# Patient Record
Sex: MALE | Race: WHITE | Employment: UNEMPLOYED | URBAN - METROPOLITAN AREA
[De-identification: names, ages, dates, MRNs, and addresses within clinical notes are randomized per-mention and may not be internally consistent; named-entity substitution may affect disease eponyms.]

---

## 2021-02-22 ENCOUNTER — HOSPITAL ENCOUNTER (EMERGENCY)
Age: 36
Discharge: HOME | End: 2021-02-23
Attending: EMERGENCY MEDICINE
Payer: MEDICAID

## 2021-02-22 ENCOUNTER — APPOINTMENT (OUTPATIENT)
Dept: GENERAL RADIOLOGY | Age: 36
End: 2021-02-22
Attending: EMERGENCY MEDICINE
Payer: MEDICAID

## 2021-02-22 VITALS
SYSTOLIC BLOOD PRESSURE: 154 MMHG | TEMPERATURE: 97.9 F | HEART RATE: 132 BPM | WEIGHT: 229.5 LBS | BODY MASS INDEX: 30.42 KG/M2 | OXYGEN SATURATION: 99 % | HEIGHT: 73 IN | DIASTOLIC BLOOD PRESSURE: 71 MMHG | RESPIRATION RATE: 17 BRPM

## 2021-02-22 DIAGNOSIS — S61.210A LACERATION OF RIGHT INDEX FINGER WITHOUT FOREIGN BODY WITHOUT DAMAGE TO NAIL, INITIAL ENCOUNTER: ICD-10-CM

## 2021-02-22 DIAGNOSIS — S68.612A COMPLETE TRAUMATIC AMPUTATION OF RIGHT MIDDLE FINGER THROUGH PHALANX, INITIAL ENCOUNTER: Primary | ICD-10-CM

## 2021-02-22 PROCEDURE — 74011250636 HC RX REV CODE- 250/636: Performed by: EMERGENCY MEDICINE

## 2021-02-22 PROCEDURE — 73130 X-RAY EXAM OF HAND: CPT

## 2021-02-22 PROCEDURE — 74011250637 HC RX REV CODE- 250/637: Performed by: EMERGENCY MEDICINE

## 2021-02-22 PROCEDURE — 90471 IMMUNIZATION ADMIN: CPT

## 2021-02-22 PROCEDURE — 75810000293 HC SIMP/SUPERF WND  RPR

## 2021-02-22 PROCEDURE — 99284 EMERGENCY DEPT VISIT MOD MDM: CPT

## 2021-02-22 PROCEDURE — 90715 TDAP VACCINE 7 YRS/> IM: CPT | Performed by: EMERGENCY MEDICINE

## 2021-02-22 RX ORDER — ACETAMINOPHEN 500 MG
1000 TABLET ORAL ONCE
Status: COMPLETED | OUTPATIENT
Start: 2021-02-22 | End: 2021-02-22

## 2021-02-22 RX ORDER — OXYCODONE HYDROCHLORIDE 5 MG/1
5 TABLET ORAL
Status: COMPLETED | OUTPATIENT
Start: 2021-02-22 | End: 2021-02-22

## 2021-02-22 RX ORDER — IBUPROFEN 600 MG/1
600 TABLET ORAL ONCE
Status: COMPLETED | OUTPATIENT
Start: 2021-02-22 | End: 2021-02-22

## 2021-02-22 RX ADMIN — TETANUS TOXOID, REDUCED DIPHTHERIA TOXOID AND ACELLULAR PERTUSSIS VACCINE, ADSORBED 0.5 ML: 5; 2.5; 8; 8; 2.5 SUSPENSION INTRAMUSCULAR at 22:11

## 2021-02-22 RX ADMIN — IBUPROFEN 600 MG: 600 TABLET, FILM COATED ORAL at 22:11

## 2021-02-22 RX ADMIN — ACETAMINOPHEN 1000 MG: 500 TABLET ORAL at 22:11

## 2021-02-22 RX ADMIN — OXYCODONE 5 MG: 5 TABLET ORAL at 22:11

## 2021-02-22 NOTE — Clinical Note
[address] 
[facility] EMERGENCY DEPT 
[address] [facility] [place] [id number] [phone] Work/School Note Date: 2/22/2021 To Whom It May concern: Galina Barrett was seen and treated today in the emergency room by the following provider(s): 
Attending Provider: Harris Becerra MD. Galina Barrett is excused from work/school on 02/22/21 and 02/23/21. He is medically clear to return to work/school on 2/24/2021. Sincerely, Laurence Gilford, MD

## 2021-02-23 PROCEDURE — 75810000293 HC SIMP/SUPERF WND  RPR

## 2021-02-23 NOTE — DISCHARGE INSTRUCTIONS
In 2 days I want you to remove and change the bandages. You can decide if you would like to bandage the index finger or leave it open to air, with the middle finger I want you to continue to change the bandage for at least 1 week. The sutures for the index finger need to come out in 7 to 10 days. Please observe for signs of infection.

## 2021-02-23 NOTE — ED NOTES
Mino Robertson MD reviewed discharge instructions with the patient. The patient verbalized understanding. All questions and concerns were addressed. The patient declined a wheelchair and is discharged ambulatory in the care of family members with instructions and prescriptions in hand. Pt is alert and oriented x 4. Respirations are clear and unlabored.

## 2021-02-23 NOTE — ED PROVIDER NOTES
EMERGENCY DEPARTMENT HISTORY AND PHYSICAL EXAM      Date: 2/22/2021  Patient Name: Lita Hankins    History of Presenting Illness     Chief Complaint   Patient presents with    Laceration     Pt was putting a new blade on his saw tonight when he accidentally cut two of his fingers on his right hand. Middle and ring finger.  Finger Pain       History Provided By: Patient    HPI: Lita Hankins, 30 y.o. male  With past medical history of opiate use disorder currently clean for about 2 years presenting today with a injury due to a saw. 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 He notes pain to these areas. No tingling, no other complaints here. He wrapped them and came here. Unsure of his last tetanus shot. There are no other complaints, changes, or physical findings at this time. PCP: Unknown, Provider    No current facility-administered medications on file prior to encounter. No current outpatient medications on file prior to encounter. Past History     Past Medical History:  Past Medical History:   Diagnosis Date    Abscess     Hepatitis C        Past Surgical History:  Past Surgical History:   Procedure Laterality Date    HX WISDOM TEETH EXTRACTION         Family History:  No family history on file.     Social History:  Social History     Tobacco Use    Smoking status: Current Every Day Smoker     Packs/day: 0.50   Substance Use Topics    Alcohol use: No    Drug use: No       Allergies:  No Known Allergies      Review of Systems   Constitutional: No  fever  Skin: No  rash  HEENT: No  nasal congestion  Resp: No cough  CV: No chest pain  GI: no vomiting  : No dysuria  MSK: + joint pain  Neuro: No numbness  Psych: No anxiety      Physical Exam         Diagnostic Study Results     Labs -   No results found for this or any previous visit (from the past 12 hour(s)). Radiologic Studies -   XR HAND RT MIN 3 V   Final Result      Traumatic amputation of the distal tuft of the third distal phalanx. XR 2ND FINGER RT MIN 2 V    (Results Pending)     CT Results  (Last 48 hours)    None        CXR Results  (Last 48 hours)    None          Medical Decision Making   I am the first provider for this patient. I reviewed the vital signs, available nursing notes, past medical history, past surgical history, family history and social history. Vital Signs-Reviewed the patient's vital signs. Patient Vitals for the past 12 hrs:   Temp Pulse Resp BP SpO2   02/22/21 2208     99 %   02/22/21 2207    (!) 154/71    02/22/21 2139 97.9 °F (36.6 °C) (!) 132 17 126/81 98 %       Pulse Oximetry Analysis - 98% on room air  -  Interpretation: Normal    Cardiac Monitor:   Rate: 132 bpm  Rhythm: Sinus Tachycardia      Provider Notes (Medical Decision Making):     Differential Diagnosis: Fracture, avulsion injury,, contusion    Initial Plan: Patient has avulsion injury on the middle finger of the right hand missing most of the fingernail, but still with nailbed intact, he does have fracture of the distal phalanx associated with this. I wrapped this and provided wound care and discuss plan to follow-up with hand surgery. The patient had a laceration on the index finger and this was repaired as per the procedure note. Ultimately patient is discharged after this after tetanus update. ED Course:   Initial assessment performed. The patients presenting problems have been discussed, and they are in agreement with the care plan formulated and outlined with them. I have encouraged them to ask questions as they arise throughout their visit. ED Course as of Feb 23 0050   Mon Feb 22, 2021   2315 On my interpretation of the patient's x-ray there is Traumatic amputation in the distal phalanx of the third digit on the right hand.     [NW]      ED Course User Index  [NW] Adry Choi MD Ashley Sanabria MD, am the attending of record for this patient encounter. Procedure Note - Laceration Repair:  12:46 AM  Procedure by Jennifer Membreno MD  .  Complexity: simple  2cm stellate laceration to right index finger  was irrigated copiously with NS under jet lavage, prepped with Chlorprep and draped in a sterile fashion. The area was anesthetized with 4 mLs of  Lidocaine 1% with epinephrine via digital block. The wound was explored with the following results: No foreign bodies found, No tendon laceration seen. The wound was repaired with One layer suture closure: Skin Layer:  12 sutures placed, stitch type:simple interrupted, suture: 6-0 chromic gut. .  The wound was closed with good hemostasis and approximation. Sterile dressing applied. Estimated blood loss: 5cc  The procedure took 1-15 minutes, and pt tolerated well. Dispo: Discharged. The patient has been re-evaluated and is ready for discharge. Reviewed available results with patient. Counseled patient on diagnosis and care plan. Patient has expressed understanding, and all questions have been answered. Patient agrees with plan and agrees to follow up as recommended, or to return to the ED if their symptoms worsen. Discharge instructions have been provided and explained to the patient, along with reasons to return to the ED. PLAN:  There are no discharge medications for this patient. 2.     Follow-up Information     Follow up With Specialties Details Why Contact Info    [facility]  Schedule an appointment as soon as possible for a visit   [facility]  [address]  [address] [facility]  [phone]        3. Return to ED if worse       Diagnosis     Clinical Impression:   1. 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  Laceration of right index finger without foreign body without damage to nail, initial encounter        Attestations:    Sandra Shin Almaz Boswell MD    Please note that this dictation was completed with [employer], the computer voice recognition software. Quite often unanticipated grammatical, syntax, homophones, and other interpretive errors are inadvertently transcribed by the computer software. Please disregard these errors. Please excuse any errors that have escaped final proofreading. Thank you.

## 2021-02-23 NOTE — ED NOTES
Pt states that he was using a saw at home to change a blade and he went too far and it cut his rt middle finger and 2nd digit (pointer finger). Pt denies taking any medicines at home PTA. Pt states pain in 10/10. Pt denies any cp, sob, n/v/d, chills at this time. Pt a/o x4. Upon assessment, bleeding is controlled and fingers were wrapped in triage by tech. Pt resting in bed with call bell within reach. 2230: Wood mD at bedside at this time. Middle finger missing fingernail and anterior tip, will need wound care. Pointer finger has skin flap and being stitched by wood MD at this time.

## 2021-02-26 ENCOUNTER — HOSPITAL ENCOUNTER (EMERGENCY)
Age: 36
Discharge: HOME | End: 2021-02-26
Attending: EMERGENCY MEDICINE
Payer: MEDICAID

## 2021-02-26 VITALS
RESPIRATION RATE: 18 BRPM | TEMPERATURE: 98.4 F | HEART RATE: 100 BPM | DIASTOLIC BLOOD PRESSURE: 98 MMHG | SYSTOLIC BLOOD PRESSURE: 172 MMHG | BODY MASS INDEX: 30.68 KG/M2 | HEIGHT: 73 IN | OXYGEN SATURATION: 98 % | WEIGHT: 231.48 LBS

## 2021-02-26 DIAGNOSIS — S68.119D AMPUTATION FINGER, SUBSEQUENT ENCOUNTER: Primary | ICD-10-CM

## 2021-02-26 DIAGNOSIS — S61.219D FINGER LACERATION, SUBSEQUENT ENCOUNTER: ICD-10-CM

## 2021-02-26 DIAGNOSIS — R03.0 ELEVATED BLOOD PRESSURE READING: ICD-10-CM

## 2021-02-26 PROCEDURE — 99281 EMR DPT VST MAYX REQ PHY/QHP: CPT

## 2021-02-26 NOTE — ED PROVIDER NOTES
EMERGENCY DEPARTMENT HISTORY AND PHYSICAL EXAM      Date: 2/26/2021  Patient Name: Alisha Kelly    History of Presenting Illness     Chief Complaint   Patient presents with    Laceration     Monday he cut his first 2 fingers on his right hand on a table saw. He was seen here and supposed to f/u with ortho. He went over there and they wouldn't see him because he didn't have his id and sent him over here to check his fingers. History Provided By: Patient    HPI: Alisha Kelly, 30 y.o. RHD male with presents to the ED for wound check. He was seen in this ED 2 days ago for a right distal finger amputation and finger laceration following injury with table saw. He attempted to follow-up with Ortho/hand but was turned away because he did not have an ID. Patient states he is unable to get an ID at this time since he does not have a Social Security card and he is currently under house arrest.  He has been cleaning the wound and performing at home dressing changes without difficulty. He denies increasing pain, advancing redness, purulent drainage, fevers. There are no other complaints, changes, or physical findings at this time. PCP: Unknown, Provider    No current facility-administered medications on file prior to encounter. No current outpatient medications on file prior to encounter. Past History     Past Medical History:  Past Medical History:   Diagnosis Date    Abscess     Hepatitis C        Past Surgical History:  Past Surgical History:   Procedure Laterality Date    HX WISDOM TEETH EXTRACTION         Family History:  No family history on file. Social History:  Social History     Tobacco Use    Smoking status: Current Every Day Smoker     Packs/day: 0.50   Substance Use Topics    Alcohol use: No    Drug use: No       Allergies:  No Known Allergies      Review of Systems   Review of Systems   Constitutional: Negative for fever. Skin: Positive for wound.    Hematological: Does not bruise/bleed easily.       Physical Exam   Physical Exam  Vitals signs and nursing note reviewed.   Constitutional:       General: He is not in acute distress.     Appearance: Normal appearance. He is not toxic-appearing.   HENT:      Head: Normocephalic and atraumatic.   Eyes:      Extraocular Movements: Extraocular movements intact.      Conjunctiva/sclera: Conjunctivae normal.   Neck:      Musculoskeletal: Normal range of motion and neck supple.      Trachea: Phonation normal.   Pulmonary:      Effort: Pulmonary effort is normal.   Musculoskeletal: Normal range of motion.      Comments: R 3rd digit with complete fingertip amputation distal to DIP joint, in early stages of wound healing.  R 2nd digit with reattached flap is pale in color without cap refill.  No advancing erythema or purulent drainage.  Good range of motion in flexion and extension of all digits.   Skin:     General: Skin is warm and dry.   Neurological:      Mental Status: He is alert and oriented to person, place, and time.      GCS: GCS eye subscore is 4. GCS verbal subscore is 5. GCS motor subscore is 6.   Psychiatric:         Mood and Affect: Mood normal.         Behavior: Behavior normal.                 Diagnostic Study Results     Labs -   No results found for this or any previous visit (from the past 12 hour(s)).    Radiologic Studies -   No orders to display     CT Results  (Last 48 hours)    None        CXR Results  (Last 48 hours)    None            Medical Decision Making   I am the first provider for this patient.    I reviewed the vital signs, available nursing notes, past medical history, past surgical history, family history and social history.    Vital Signs-Reviewed the patient's vital signs.  Patient Vitals for the past 12 hrs:   Temp Pulse Resp BP SpO2   02/26/21 1508 98.4 °F (36.9 °C) 100 18 (!) 172/98 98 %       Records Reviewed: Nursing Notes, Old Medical Records and Previous Radiology Studies    Provider Notes (Medical  Decision Making): Amputation wound appears to be healing as expected. Flap that was reattached with sutures on right index finger appears to be nonviable. No evidence of infection. Patient has good range of motion in flexion and extension of all digits, doubt tendon laceration. Recommended patient continue excellent wound care at home and continue to monitor for signs and symptoms of infection. Follow-up with hand/Ortho when possible. ED return precautions given. Patient is in agreement this plan. ED Course:   Initial assessment performed. The patients presenting problems have been discussed, and they are in agreement with the care plan formulated and outlined with them. I have encouraged them to ask questions as they arise throughout their visit. Critical Care Time: None    Disposition:  Discharge    PLAN:  1. There are no discharge medications for this patient. 2.   Follow-up Information     Follow up With Specialties Details Why Contact Info    [facility] EMERGENCY DEPT Emergency Medicine  As needed, If symptoms worsen [address] [facility] [address]    Baljit Sung MD Hand Surgery Call  For follow up [address] [place] [address]  [address]  [phone]          Return to ED if worse     Diagnosis     Clinical Impression:   1. Amputation finger, subsequent encounter    2. Finger laceration, subsequent encounter    3. Elevated blood pressure reading          Please note that this dictation was completed with [employer], the computer voice recognition software. Quite often unanticipated grammatical, syntax, homophones, and other interpretive errors are inadvertently transcribed by the computer software. Please disregards these errors. Please excuse any errors that have escaped final proofreading.

## 2021-02-26 NOTE — ED NOTES
ONUR Thakkar reviewed discharge instructions with the patient and spouse. The patient and spouse verbalized understanding.

## 2021-06-28 ENCOUNTER — HOSPITAL ENCOUNTER (EMERGENCY)
Age: 36
Discharge: HOME | End: 2021-06-28
Attending: EMERGENCY MEDICINE
Payer: MEDICAID

## 2021-06-28 ENCOUNTER — APPOINTMENT (OUTPATIENT)
Dept: CT IMAGING | Age: 36
End: 2021-06-28
Attending: PHYSICIAN ASSISTANT
Payer: MEDICAID

## 2021-06-28 VITALS
DIASTOLIC BLOOD PRESSURE: 93 MMHG | SYSTOLIC BLOOD PRESSURE: 127 MMHG | RESPIRATION RATE: 18 BRPM | WEIGHT: 194 LBS | BODY MASS INDEX: 25.71 KG/M2 | OXYGEN SATURATION: 100 % | TEMPERATURE: 98.5 F | HEIGHT: 73 IN | HEART RATE: 86 BPM

## 2021-06-28 DIAGNOSIS — L73.9 FOLLICULITIS: ICD-10-CM

## 2021-06-28 DIAGNOSIS — K52.9 GASTROENTERITIS: Primary | ICD-10-CM

## 2021-06-28 DIAGNOSIS — M54.6 ACUTE RIGHT-SIDED THORACIC BACK PAIN: ICD-10-CM

## 2021-06-28 LAB
ALBUMIN SERPL-MCNC: 3.3 G/DL (ref 3.5–5)
ALBUMIN/GLOB SERPL: 0.8 {RATIO} (ref 1.1–2.2)
ALP SERPL-CCNC: 154 U/L (ref 45–117)
ALT SERPL-CCNC: 32 U/L (ref 12–78)
ANION GAP SERPL CALC-SCNC: 3 MMOL/L (ref 5–15)
APPEARANCE UR: CLEAR
AST SERPL-CCNC: 22 U/L (ref 15–37)
BACTERIA URNS QL MICRO: NEGATIVE /HPF
BASOPHILS # BLD: 0 K/UL (ref 0–0.1)
BASOPHILS NFR BLD: 0 % (ref 0–1)
BILIRUB SERPL-MCNC: 0.3 MG/DL (ref 0.2–1)
BILIRUB UR QL: NEGATIVE
BUN SERPL-MCNC: 14 MG/DL (ref 6–20)
BUN/CREAT SERPL: 15 (ref 12–20)
CALCIUM SERPL-MCNC: 8.4 MG/DL (ref 8.5–10.1)
CAOX CRY URNS QL MICRO: ABNORMAL
CHLORIDE SERPL-SCNC: 108 MMOL/L (ref 97–108)
CO2 SERPL-SCNC: 27 MMOL/L (ref 21–32)
COLOR UR: ABNORMAL
CREAT SERPL-MCNC: 0.96 MG/DL (ref 0.7–1.3)
CRP SERPL-MCNC: 1.49 MG/DL (ref 0–0.6)
DIFFERENTIAL METHOD BLD: ABNORMAL
EOSINOPHIL # BLD: 0.1 K/UL (ref 0–0.4)
EOSINOPHIL NFR BLD: 1 % (ref 0–7)
EPITH CASTS URNS QL MICRO: ABNORMAL /LPF
ERYTHROCYTE [DISTWIDTH] IN BLOOD BY AUTOMATED COUNT: 15 % (ref 11.5–14.5)
ERYTHROCYTE [SEDIMENTATION RATE] IN BLOOD: 5 MM/HR (ref 0–15)
GLOBULIN SER CALC-MCNC: 3.9 G/DL (ref 2–4)
GLUCOSE SERPL-MCNC: 91 MG/DL (ref 65–100)
GLUCOSE UR STRIP.AUTO-MCNC: NEGATIVE MG/DL
HCT VFR BLD AUTO: 44.5 % (ref 36.6–50.3)
HGB BLD-MCNC: 14.5 G/DL (ref 12.1–17)
HGB UR QL STRIP: NEGATIVE
IMM GRANULOCYTES # BLD AUTO: 0 K/UL (ref 0–0.04)
IMM GRANULOCYTES NFR BLD AUTO: 0 % (ref 0–0.5)
KETONES UR QL STRIP.AUTO: NEGATIVE MG/DL
LEUKOCYTE ESTERASE UR QL STRIP.AUTO: NEGATIVE
LIPASE SERPL-CCNC: 72 U/L (ref 73–393)
LYMPHOCYTES # BLD: 1.1 K/UL (ref 0.8–3.5)
LYMPHOCYTES NFR BLD: 17 % (ref 12–49)
MCH RBC QN AUTO: 29.2 PG (ref 26–34)
MCHC RBC AUTO-ENTMCNC: 32.6 G/DL (ref 30–36.5)
MCV RBC AUTO: 89.5 FL (ref 80–99)
MONOCYTES # BLD: 0.8 K/UL (ref 0–1)
MONOCYTES NFR BLD: 12 % (ref 5–13)
NEUTS SEG # BLD: 4.7 K/UL (ref 1.8–8)
NEUTS SEG NFR BLD: 69 % (ref 32–75)
NITRITE UR QL STRIP.AUTO: NEGATIVE
NRBC # BLD: 0 K/UL (ref 0–0.01)
NRBC BLD-RTO: 0 PER 100 WBC
PH UR STRIP: 5.5 [PH] (ref 5–8)
PLATELET # BLD AUTO: 177 K/UL (ref 150–400)
PMV BLD AUTO: 10.4 FL (ref 8.9–12.9)
POTASSIUM SERPL-SCNC: 4.2 MMOL/L (ref 3.5–5.1)
PROT SERPL-MCNC: 7.2 G/DL (ref 6.4–8.2)
PROT UR STRIP-MCNC: NEGATIVE MG/DL
RBC # BLD AUTO: 4.97 M/UL (ref 4.1–5.7)
RBC #/AREA URNS HPF: ABNORMAL /HPF (ref 0–5)
SODIUM SERPL-SCNC: 138 MMOL/L (ref 136–145)
SP GR UR REFRACTOMETRY: 1.02 (ref 1–1.03)
UA: UC IF INDICATED,UAUC: ABNORMAL
UROBILINOGEN UR QL STRIP.AUTO: 1 EU/DL (ref 0.2–1)
WBC # BLD AUTO: 6.8 K/UL (ref 4.1–11.1)
WBC URNS QL MICRO: ABNORMAL /HPF (ref 0–4)

## 2021-06-28 PROCEDURE — 99284 EMERGENCY DEPT VISIT MOD MDM: CPT

## 2021-06-28 PROCEDURE — 96374 THER/PROPH/DIAG INJ IV PUSH: CPT

## 2021-06-28 PROCEDURE — 83690 ASSAY OF LIPASE: CPT

## 2021-06-28 PROCEDURE — 96375 TX/PRO/DX INJ NEW DRUG ADDON: CPT

## 2021-06-28 PROCEDURE — 81001 URINALYSIS AUTO W/SCOPE: CPT

## 2021-06-28 PROCEDURE — 74177 CT ABD & PELVIS W/CONTRAST: CPT

## 2021-06-28 PROCEDURE — 96376 TX/PRO/DX INJ SAME DRUG ADON: CPT

## 2021-06-28 PROCEDURE — 86140 C-REACTIVE PROTEIN: CPT

## 2021-06-28 PROCEDURE — 36415 COLL VENOUS BLD VENIPUNCTURE: CPT

## 2021-06-28 PROCEDURE — 85652 RBC SED RATE AUTOMATED: CPT

## 2021-06-28 PROCEDURE — 74011250636 HC RX REV CODE- 250/636: Performed by: PHYSICIAN ASSISTANT

## 2021-06-28 PROCEDURE — 80053 COMPREHEN METABOLIC PANEL: CPT

## 2021-06-28 PROCEDURE — 85025 COMPLETE CBC W/AUTO DIFF WBC: CPT

## 2021-06-28 PROCEDURE — 74011000636 HC RX REV CODE- 636: Performed by: EMERGENCY MEDICINE

## 2021-06-28 RX ORDER — SULFAMETHOXAZOLE AND TRIMETHOPRIM 800; 160 MG/1; MG/1
1 TABLET ORAL 2 TIMES DAILY
Qty: 14 TABLET | Refills: 0 | Status: SHIPPED | OUTPATIENT
Start: 2021-06-28 | End: 2021-07-05

## 2021-06-28 RX ORDER — METHOCARBAMOL 750 MG/1
750 TABLET, FILM COATED ORAL 4 TIMES DAILY
Qty: 20 TABLET | Refills: 0 | Status: SHIPPED | OUTPATIENT
Start: 2021-06-28

## 2021-06-28 RX ORDER — MORPHINE SULFATE 2 MG/ML
4 INJECTION, SOLUTION INTRAMUSCULAR; INTRAVENOUS ONCE
Status: COMPLETED | OUTPATIENT
Start: 2021-06-28 | End: 2021-06-28

## 2021-06-28 RX ORDER — FENTANYL CITRATE 50 UG/ML
50 INJECTION, SOLUTION INTRAMUSCULAR; INTRAVENOUS
Status: COMPLETED | OUTPATIENT
Start: 2021-06-28 | End: 2021-06-28

## 2021-06-28 RX ORDER — SODIUM CHLORIDE 9 MG/ML
1000 INJECTION, SOLUTION INTRAVENOUS ONCE
Status: COMPLETED | OUTPATIENT
Start: 2021-06-28 | End: 2021-06-28

## 2021-06-28 RX ORDER — OXYCODONE HYDROCHLORIDE 5 MG/1
5 TABLET ORAL
Qty: 8 TABLET | Refills: 0 | Status: SHIPPED | OUTPATIENT
Start: 2021-06-28 | End: 2021-07-01

## 2021-06-28 RX ORDER — ONDANSETRON 4 MG/1
4 TABLET, ORALLY DISINTEGRATING ORAL
Qty: 20 TABLET | Refills: 0 | Status: SHIPPED | OUTPATIENT
Start: 2021-06-28

## 2021-06-28 RX ORDER — MORPHINE SULFATE 10 MG/ML
6 INJECTION, SOLUTION INTRAMUSCULAR; INTRAVENOUS
Status: COMPLETED | OUTPATIENT
Start: 2021-06-28 | End: 2021-06-28

## 2021-06-28 RX ADMIN — MORPHINE SULFATE 4 MG: 2 INJECTION, SOLUTION INTRAMUSCULAR; INTRAVENOUS at 16:06

## 2021-06-28 RX ADMIN — MORPHINE SULFATE 6 MG: 10 INJECTION, SOLUTION INTRAMUSCULAR; INTRAVENOUS at 17:16

## 2021-06-28 RX ADMIN — FENTANYL CITRATE 50 MCG: 50 INJECTION INTRAMUSCULAR; INTRAVENOUS at 14:20

## 2021-06-28 RX ADMIN — IOPAMIDOL 100 ML: 755 INJECTION, SOLUTION INTRAVENOUS at 16:33

## 2021-06-28 RX ADMIN — SODIUM CHLORIDE 1000 ML: 9 INJECTION, SOLUTION INTRAVENOUS at 14:20

## 2021-06-28 NOTE — DISCHARGE INSTRUCTIONS
CT ABD PELV W CONT    Result Date: 6/28/2021  1. Status post left nephrectomy with no residual or recurrent neoplasm or lymphadenopathy obvious. 2. Normal right kidney, no hydroureteronephrosis. 3. Normal appendix. 4. No acute abdominal or pelvic abnormality is identified.          Local Primary Care Physicians  [facility] [phone]  MD Bryce Laughlin, MD Jayne Boeck, MD [facility] [phone]  Julio Cesar Blackburn, FNP  MD Jm Mak, MD Nicolasa Townsendlakshmis [facility] [address] [phone]  [place] [address] [facility] [phone]  MD Shabnam Segundo MD Tama Portland, MD Elissa Dukes, MD   [facility] [phone]  ASPEN [id number] , MD Corey Hightower, MD Olga Rendon, NP [address] [phone]  MD Primitivo Azul MD Sharlette Blue, MD Steven Lang, MD Perla Rubi, MD Tania Cabral MD   [address] [facility] [phone]  Kelley Estes MD [facility] [phone]  MD Eric Albert [facility] [place], NP  Amy Abraham, MD Lluvia Vo, MD Corby Amador MD   [address] [facility] [phone]  MD Jovan Hill Aas, FNP  Kalyan Bound, NP  Mandy Cynthia, MD Heavenly Hanna MD Venia Blitz, MD [facility] [phone]  MD Sury Spann MD Paulla Bunk, MD Karole Nunnery, MD Rick Gee MD   [address] [phone]  MD Talon Fuentes MD [facility] [phone]  MD Pamela Morel, MD Bard Rene MD   [facility] [phone]  MD Piyush Ernst MD Bernestine Forster, MD Hawa Garza, MD Nino Fernandez, MD Misa Tirado, NP  [place] [facility], [address] [facility]   [phone]  MD Rene Franklin MD Clevester Hercules, MD   [address] [facility] [phone]  Brigette Gerber, VISHAL Fritz PA-C Ric Hageman, CARLTON Latham MD Lindalee Perry, JACINTO Arndt,  Miscellaneous:  Molly Nicole [phone]

## 2021-06-28 NOTE — ED PROVIDER NOTES
EMERGENCY DEPARTMENT HISTORY AND PHYSICAL EXAM      Date: 2021  Patient Name: Jimbo Yeung    History of Presenting Illness     Chief Complaint   Patient presents with    Diarrhea     diarrhea x 2 days now    Flank Pain     x 2 days. pt has a hx of only having one kidney. pt does still have his kidney on his right side       History Provided By: Patient    HPI: Jimbo Yeung, 29 y.o. male with PMHx significant for left nephrectomy secondary to renal carcinoma, epidural abscess, IVDU, presents to the ED with cc of right flank pain onset 2 days ago. The patient reports that he went out drinking 3 nights ago and woke up with some pain to his right flank the next morning. He then began having diarrhea. The pain has gradually worsened since then. He has had a few episodes of vomiting. He feels generally weak. He is concerned because he has a history of renal carcinoma that required a left nephrectomy and is unsure if this could be a recurrence of the cancer. He also reports a history of epidural abscess secondary to IV drug abuse and is concerned that his back pain could be caused by a recurrence. He denies recent IV drug abuse. No fevers, extremity numbness or weakness. There are no other complaints, changes, or physical findings at this time. PCP: Unknown, Provider, MD    No current facility-administered medications on file prior to encounter. No current outpatient medications on file prior to encounter. Past History     Past Medical History:  Past Medical History:   Diagnosis Date    Abscess     Hepatitis C        Past Surgical History:  Past Surgical History:   Procedure Laterality Date    HX WISDOM TEETH EXTRACTION         Family History:  History reviewed. No pertinent family history.     Social History:  Social History     Tobacco Use    Smoking status: Former Smoker     Packs/day: 0.50     Quit date: 2020     Years since quittin.0    Smokeless tobacco: Current User Substance Use Topics    Alcohol use: Yes     Comment: 2 glasses of beer and 2 mixed drinks a day     Drug use: Yes     Types: Marijuana       Allergies:  No Known Allergies      Review of Systems   Review of Systems   Constitutional: Negative for chills and fever. HENT: Negative for ear pain and sore throat. Eyes: Negative for redness and visual disturbance. Respiratory: Negative for cough and shortness of breath. Cardiovascular: Negative for chest pain and palpitations. Gastrointestinal: Positive for abdominal pain, diarrhea, nausea and vomiting. Negative for blood in stool. Genitourinary: Positive for flank pain. Negative for dysuria and hematuria. Musculoskeletal: Positive for back pain. Negative for gait problem. Skin: Negative for rash and wound. Neurological: Negative for dizziness and headaches. Psychiatric/Behavioral: Negative for behavioral problems and confusion. All other systems reviewed and are negative. Physical Exam   Physical Exam  Constitutional:       Appearance: He is not toxic-appearing. Comments: Interactive male in no acute distress. HENT:      Head: Normocephalic and atraumatic. Mouth/Throat:      Mouth: Mucous membranes are moist.   Eyes:      Extraocular Movements: Extraocular movements intact. Pupils: Pupils are equal, round, and reactive to light. Cardiovascular:      Rate and Rhythm: Normal rate and regular rhythm. Heart sounds: Normal heart sounds. No murmur heard. Pulmonary:      Effort: Pulmonary effort is normal. No respiratory distress. Breath sounds: Normal breath sounds. No wheezing, rhonchi or rales. Abdominal:      Comments: Abdomen is flat and soft. No tenderness to palpation. No rebound or guarding. Mild right CVA tenderness. Musculoskeletal:         General: No deformity. Normal range of motion. Cervical back: Normal range of motion and neck supple.       Comments: Surgical incision noted over the upper thoracic spine. Mild tenderness to palpation over the lower thoracic spine. Full range of motion. Skin:     General: Skin is warm and dry. Comments: Few pustules and scabs noted to the bilateral lower extremities. Neurological:      General: No focal deficit present. Mental Status: He is alert and oriented to person, place, and time. Sensory: No sensory deficit. Motor: No weakness. Psychiatric:         Behavior: Behavior normal.           Diagnostic Study Results     Labs -     Recent Results (from the past 12 hour(s))   CBC WITH AUTOMATED DIFF    Collection Time: 06/28/21  2:06 PM   Result Value Ref Range    WBC 6.8 4.1 - 11.1 K/uL    RBC 4.97 4.10 - 5.70 M/uL    HGB 14.5 12.1 - 17.0 g/dL    HCT 44.5 36.6 - 50.3 %    MCV 89.5 80.0 - 99.0 FL    MCH 29.2 26.0 - 34.0 PG    MCHC 32.6 30.0 - 36.5 g/dL    RDW 15.0 (H) 11.5 - 14.5 %    PLATELET 852 733 - 437 K/uL    MPV 10.4 8.9 - 12.9 FL    NRBC 0.0 0  WBC    ABSOLUTE NRBC 0.00 0.00 - 0.01 K/uL    NEUTROPHILS 69 32 - 75 %    LYMPHOCYTES 17 12 - 49 %    MONOCYTES 12 5 - 13 %    EOSINOPHILS 1 0 - 7 %    BASOPHILS 0 0 - 1 %    IMMATURE GRANULOCYTES 0 0.0 - 0.5 %    ABS. NEUTROPHILS 4.7 1.8 - 8.0 K/UL    ABS. LYMPHOCYTES 1.1 0.8 - 3.5 K/UL    ABS. MONOCYTES 0.8 0.0 - 1.0 K/UL    ABS. EOSINOPHILS 0.1 0.0 - 0.4 K/UL    ABS. BASOPHILS 0.0 0.0 - 0.1 K/UL    ABS. IMM.  GRANS. 0.0 0.00 - 0.04 K/UL    DF AUTOMATED     METABOLIC PANEL, COMPREHENSIVE    Collection Time: 06/28/21  2:06 PM   Result Value Ref Range    Sodium 138 136 - 145 mmol/L    Potassium 4.2 3.5 - 5.1 mmol/L    Chloride 108 97 - 108 mmol/L    CO2 27 21 - 32 mmol/L    Anion gap 3 (L) 5 - 15 mmol/L    Glucose 91 65 - 100 mg/dL    BUN 14 6 - 20 MG/DL    Creatinine 0.96 0.70 - 1.30 MG/DL    BUN/Creatinine ratio 15 12 - 20      GFR est AA >60 >60 ml/min/1.73m2    GFR est non-AA >60 >60 ml/min/1.73m2    Calcium 8.4 (L) 8.5 - 10.1 MG/DL    Bilirubin, total 0.3 0.2 - 1.0 MG/DL    ALT (SGPT) 32 12 - 78 U/L    AST (SGOT) 22 15 - 37 U/L    Alk. phosphatase 154 (H) 45 - 117 U/L    Protein, total 7.2 6.4 - 8.2 g/dL    Albumin 3.3 (L) 3.5 - 5.0 g/dL    Globulin 3.9 2.0 - 4.0 g/dL    A-G Ratio 0.8 (L) 1.1 - 2.2     LIPASE    Collection Time: 06/28/21  2:06 PM   Result Value Ref Range    Lipase 72 (L) 73 - 393 U/L   URINALYSIS W/ REFLEX CULTURE    Collection Time: 06/28/21  2:06 PM    Specimen: Urine   Result Value Ref Range    Color YELLOW/STRAW      Appearance CLEAR CLEAR      Specific gravity 1.025 1.003 - 1.030      pH (UA) 5.5 5.0 - 8.0      Protein Negative NEG mg/dL    Glucose Negative NEG mg/dL    Ketone Negative NEG mg/dL    Bilirubin Negative NEG      Blood Negative NEG      Urobilinogen 1.0 0.2 - 1.0 EU/dL    Nitrites Negative NEG      Leukocyte Esterase Negative NEG      WBC 0-4 0 - 4 /hpf    RBC 0-5 0 - 5 /hpf    Epithelial cells FEW FEW /lpf    Bacteria Negative NEG /hpf    UA:UC IF INDICATED CULTURE NOT INDICATED BY UA RESULT CNI      CA Oxalate crystals 2+ (A) NEG   C REACTIVE PROTEIN, QT    Collection Time: 06/28/21  2:12 PM   Result Value Ref Range    C-Reactive protein 1.49 (H) 0.00 - 0.60 mg/dL   SED RATE (ESR)    Collection Time: 06/28/21  2:12 PM   Result Value Ref Range    Sed rate, automated 5 0 - 15 mm/hr       Radiologic Studies -   CT ABD PELV W CONT   Final Result   1. Status post left nephrectomy with no residual or recurrent neoplasm or   lymphadenopathy obvious. 2. Normal right kidney, no hydroureteronephrosis. 3. Normal appendix. 4. No acute abdominal or pelvic abnormality is identified. CT Results  (Last 48 hours)               06/28/21 1633  CT ABD PELV W CONT Final result    Impression:  1. Status post left nephrectomy with no residual or recurrent neoplasm or   lymphadenopathy obvious. 2. Normal right kidney, no hydroureteronephrosis. 3. Normal appendix. 4. No acute abdominal or pelvic abnormality is identified.        Narrative:  EXAM: CT ABD PELV W CONT       INDICATION: right flank, right sided abdominal pain, diarrhea, midline T spine   tenderness - history of left nephrectomy due to renal carcinoma and history of   spinal epidural abscess . Also history of hepatitis C.       COMPARISON: None. CONTRAST: 100 mL of Isovue-370. TECHNIQUE:    Following the uneventful intravenous administration of contrast, thin axial   images were obtained through the abdomen and pelvis. Coronal and sagittal   reconstructions were generated. Oral contrast was not administered. CT dose   reduction was achieved through use of a standardized protocol tailored for this   examination and automatic exposure control for dose modulation. FINDINGS:    LOWER THORAX: No significant abnormality in the incidentally imaged lower chest.   LIVER: No mass. BILIARY TREE: Gallbladder is within normal limits. CBD is not dilated. SPLEEN: within normal limits. PANCREAS: No mass or ductal dilatation. ADRENALS: Unremarkable. KIDNEYS: The left kidney is surgically absent. There is no abnormal mass or   fluid collection in the nephrectomy bed. The right kidney is unremarkable. STOMACH: Unremarkable. SMALL BOWEL: No dilatation or wall thickening. COLON: No dilatation or wall thickening. APPENDIX: Normal   PERITONEUM: No ascites or pneumoperitoneum. RETROPERITONEUM: No lymphadenopathy or aortic aneurysm. REPRODUCTIVE ORGANS: Unremarkable for age   URINARY BLADDER: No mass or calculus. BONES: No destructive bone lesion. ABDOMINAL WALL: No mass or hernia. ADDITIONAL COMMENTS: N/A               CXR Results  (Last 48 hours)    None            Medical Decision Making   I am the first provider for this patient. I reviewed the vital signs, available nursing notes, past medical history, past surgical history, family history and social history. Vital Signs-Reviewed the patient's vital signs.   Patient Vitals for the past 12 hrs:   Temp Pulse Resp BP SpO2 06/28/21 1600    (!) 127/93 100 %   06/28/21 1408     98 %   06/28/21 1344 98.5 °F (36.9 °C) 86 18 (!) 138/92 99 %         Records Reviewed: Nursing Notes, Old Medical Records, Previous Radiology Studies and Previous Laboratory Studies      Provider Notes (Medical Decision Making):   DDx: UTI, pyelonephritis, kidney stone, musculoskeletal strain, renal carcinoma, folliculitis, acute kidney injury, diverticulitis, gastroenteritis    Patient presents with right flank pain and diarrhea. He is afebrile and clinically well-appearing. Neurological exam is intact. He is concerned for recurrence of spinal epidural abscess but I have low suspicion given no neurological defects, no fever, and reassuring vital signs. Sed rate is not elevated. Labs are unremarkable. CT abdomen and pelvis shows no acute process. He was treated symptomatically with improvement in pain. Discussed symptomatic treatment with oral rehydration, analgesia and muscle relaxer, and over-the-counter antidiarrheal agent. Discussed follow-up with primary care for a recheck and discussed strict return precautions, including fever, neurological deficits. Case discussed with Dr. Damir Peraza, supervising physician, who agrees with the plan. ED Course:   Initial assessment performed. The patients presenting problems have been discussed, and they are in agreement with the care plan formulated and outlined with them. I have encouraged them to ask questions as they arise throughout their visit. Disposition:  5:26 PM  The patient has been re-evaluated and is ready for discharge. Reviewed available results with patient. Counseled patient on diagnosis and care plan. Patient has expressed understanding, and all questions have been answered. Patient agrees with plan and agrees to follow up as recommended, or to return to the ED if their symptoms worsen.  Discharge instructions have been provided and explained to the patient, along with reasons to return to the ED. PLAN:  1. Discharge Medication List as of 6/28/2021  5:26 PM      START taking these medications    Details   oxyCODONE IR (Roxicodone) 5 mg immediate release tablet Take 1 Tablet by mouth every six (6) hours as needed for Pain for up to 3 days. Max Daily Amount: 20 mg., Normal, Disp-8 Tablet, R-0      methocarbamoL (ROBAXIN) 750 mg tablet Take 1 Tablet by mouth four (4) times daily. , Normal, Disp-20 Tablet, R-0      ondansetron (Zofran ODT) 4 mg disintegrating tablet 1 Tablet by SubLINGual route every eight (8) hours as needed for Nausea or Vomiting., Normal, Disp-20 Tablet, R-0      trimethoprim-sulfamethoxazole (Bactrim DS) 160-800 mg per tablet Take 1 Tablet by mouth two (2) times a day for 7 days. , Normal, Disp-14 Tablet, R-0           2. Follow-up Information     Follow up With Specialties Details Why Contact Info    Primary care provider  Schedule an appointment as soon as possible for a visit in 1 week for a recheck     [facility] EMERGENCY DEPT Emergency Medicine Go to  If symptoms worsen [address] [place] [address]  [phone]        Return to ED if worse     Diagnosis     Clinical Impression:   1. Gastroenteritis    2. Acute right-sided thoracic back pain    3. Folliculitis            Marques Caceres.  CARLTON Us

## 2023-02-11 ENCOUNTER — HOSPITAL ENCOUNTER (EMERGENCY)
Age: 38
Discharge: HOME | End: 2023-02-11
Attending: EMERGENCY MEDICINE
Payer: COMMERCIAL

## 2023-02-11 VITALS
OXYGEN SATURATION: 99 % | TEMPERATURE: 98.4 F | RESPIRATION RATE: 18 BRPM | SYSTOLIC BLOOD PRESSURE: 171 MMHG | DIASTOLIC BLOOD PRESSURE: 105 MMHG | HEART RATE: 86 BPM

## 2023-02-11 DIAGNOSIS — S61.211A LACERATION OF LEFT INDEX FINGER WITHOUT FOREIGN BODY WITHOUT DAMAGE TO NAIL, INITIAL ENCOUNTER: Primary | ICD-10-CM

## 2023-02-11 PROCEDURE — 99283 EMERGENCY DEPT VISIT LOW MDM: CPT

## 2023-02-11 RX ORDER — DOXYCYCLINE HYCLATE 100 MG
100 TABLET ORAL 2 TIMES DAILY
Qty: 14 TABLET | Refills: 0 | Status: SHIPPED | OUTPATIENT
Start: 2023-02-11 | End: 2023-02-18

## 2023-02-11 NOTE — ED TRIAGE NOTES
TRIAGE NOTE:  Patient arrives with c/o cut his pointer finger on left hand from a razor blade a week ago. Patient concerned may be infected.

## 2023-02-11 NOTE — DISCHARGE INSTRUCTIONS
Take antibiotic as prescribed. If you develop fever, chills, myalgias, redness, swelling to finger or streaking redness up your arm - return to ER.

## 2023-02-11 NOTE — ED PROVIDER NOTES
Patient is a 40year old male with history of hepatitis C and MRSA who presents to ED c/o concern for left finger infection. Patient reports he cut his left index finger on a razor blade about a week and a half ago. Reports he has been keeping the wound clean and dry and covered. He is concerned that the area may be infected. He reports history of bacteremia and a spinal infection due to MRSA a few years back and states he does not want this to recur. He denies any fever, chills, swelling, erythema, back pain, chest pain, shortness of breath, abdominal pain, myalgias. Past Medical History:   Diagnosis Date    Abscess     Hepatitis C        Past Surgical History:   Procedure Laterality Date    HX WISDOM TEETH EXTRACTION           History reviewed. No pertinent family history. Social History     Socioeconomic History    Marital status: SINGLE     Spouse name: Not on file    Number of children: Not on file    Years of education: Not on file    Highest education level: Not on file   Occupational History    Not on file   Tobacco Use    Smoking status: Former     Packs/day: 0.50     Types: Cigarettes     Quit date: 2020     Years since quittin.6    Smokeless tobacco: Current   Substance and Sexual Activity    Alcohol use: Yes     Comment: 2 glasses of beer and 2 mixed drinks a day     Drug use: Yes     Types: Marijuana    Sexual activity: Not on file   Other Topics Concern    Not on file   Social History Narrative    Not on file     Social Determinants of Health     Financial Resource Strain: Not on file   Food Insecurity: Not on file   Transportation Needs: Not on file   Physical Activity: Not on file   Stress: Not on file   Social Connections: Not on file   Intimate Partner Violence: Not on file   Housing Stability: Not on file         ALLERGIES: Patient has no known allergies. Review of Systems   Constitutional:  Negative for chills and fever. Respiratory:  Negative for shortness of breath. Cardiovascular:  Negative for chest pain. Gastrointestinal:  Negative for nausea and vomiting. Musculoskeletal:  Negative for arthralgias, back pain, myalgias, neck pain and neck stiffness. Skin:  Positive for wound. Negative for color change. Neurological:  Negative for headaches. All other systems reviewed and are negative. Vitals:    02/11/23 1806 02/11/23 1816   BP: (!) 171/105    Pulse: 100 86   Resp: 18    Temp: 98.2 °F (36.8 °C) 98.4 °F (36.9 °C)   SpO2: 99% 99%            Physical Exam  Vitals and nursing note reviewed. Constitutional:       Appearance: Normal appearance. He is well-developed. HENT:      Head: Normocephalic and atraumatic. Nose: Nose normal.      Mouth/Throat:      Mouth: Mucous membranes are moist.   Eyes:      General: Lids are normal.      Extraocular Movements: Extraocular movements intact. Conjunctiva/sclera: Conjunctivae normal.   Cardiovascular:      Rate and Rhythm: Normal rate and regular rhythm. Pulses: Normal pulses. Heart sounds: Normal heart sounds, S1 normal and S2 normal.   Pulmonary:      Effort: Pulmonary effort is normal. No accessory muscle usage. Breath sounds: Normal breath sounds. Abdominal:      General: Abdomen is flat. Palpations: Abdomen is soft. Musculoskeletal:         General: Normal range of motion. Cervical back: Normal range of motion and neck supple. Comments: Full ROM of all extremities. Full ROM of left 2nd finger without pain. NVI distally. Skin:     General: Skin is warm and dry. Capillary Refill: Capillary refill takes less than 2 seconds. Comments: Superficial laceration to palmar aspect of left 2nd finger with no surrounding erythema, warmth or drainage noted. No streaking erythema noted to left arm. Neurological:      General: No focal deficit present. Mental Status: He is alert and oriented to person, place, and time. Mental status is at baseline.    Psychiatric: Attention and Perception: Attention normal.         Mood and Affect: Mood and affect normal.         Speech: Speech normal.         Behavior: Behavior normal. Behavior is cooperative. Thought Content: Thought content normal.         Cognition and Memory: Cognition normal.         Judgment: Judgment normal.        Medical Decision Making  Patient with laceration to left second finger about a week and a half ago. He has a history of bacteremia and a spinal infection a few years ago and he is very concerned that his finger may get infected. He is requesting prophylactic antibiotics. Discussed with patient that right now there are no signs of infection. VSS. Will give prescription for doxycycline and recommend close follow-up with PCP. Strict return precaution discussed in detail with patient. All questions were asked and answered. Risk  Prescription drug management.            Procedures

## 2023-02-15 ENCOUNTER — HOSPITAL ENCOUNTER (EMERGENCY)
Age: 38
Discharge: LEFT BEFORE DISCHARGE | End: 2023-02-15
Attending: STUDENT IN AN ORGANIZED HEALTH CARE EDUCATION/TRAINING PROGRAM
Payer: COMMERCIAL

## 2023-02-15 VITALS
TEMPERATURE: 98.3 F | HEART RATE: 77 BPM | SYSTOLIC BLOOD PRESSURE: 150 MMHG | DIASTOLIC BLOOD PRESSURE: 96 MMHG | HEIGHT: 73 IN | RESPIRATION RATE: 16 BRPM | BODY MASS INDEX: 31.81 KG/M2 | OXYGEN SATURATION: 99 % | WEIGHT: 240 LBS

## 2023-02-15 PROCEDURE — 75810000275 HC EMERGENCY DEPT VISIT NO LEVEL OF CARE

## 2023-02-15 NOTE — ED TRIAGE NOTES
Triage: Pt arrives ambulatory from home with CC of substance abuse issues. He reports he uses heroin and methamphetamine. Last use today. He denies SI/HI but is fearful that using drugs will result in his death. He also has a rash on BUE, BLE, and trunk that he is concerned is infected. He reports he was seen here Monday for it and they told him it was not infected but reports concerns due to hx of MRSA.

## 2023-02-16 NOTE — BSMART NOTE
Per triage: Pt arrives ambulatory from home with CC of substance abuse issues. He reports he uses heroin and methamphetamine. Last use today. He denies SI/HI but is fearful that using drugs will result in his death. He also has a rash on BUE, BLE, and trunk that he is concerned is infected. He reports he was seen here Monday for it and they told him it was not infected but reports concerns due to hx of MRSA. BSMART consulted requested for the above noted concerns. Patient has denied SI/HI while in triage. Patient was evaluated in ER 17 to provide resources for substance abuse. After being provided resources patient was still inquiring about [facility] admission. Clinician acknowledged that using substances can lead to death, however this does not meet critera for inpatient admission. Patient was educated on local hospital that provide detox from substances. Patient was taken back to waiting area to await doctor. No full consult was completed. Patient is also low risk for SI.        Jermaine Bravo, Resident in Counseling

## 2024-12-09 ENCOUNTER — OFFICE VISIT (OUTPATIENT)
Age: 39
End: 2024-12-09

## 2024-12-09 VITALS
SYSTOLIC BLOOD PRESSURE: 150 MMHG | TEMPERATURE: 100.4 F | HEART RATE: 102 BPM | DIASTOLIC BLOOD PRESSURE: 78 MMHG | OXYGEN SATURATION: 93 % | RESPIRATION RATE: 18 BRPM | WEIGHT: 280 LBS | HEIGHT: 73 IN | BODY MASS INDEX: 37.11 KG/M2

## 2024-12-09 DIAGNOSIS — B34.9 VIRAL ILLNESS: ICD-10-CM

## 2024-12-09 DIAGNOSIS — J10.1 INFLUENZA A: Primary | ICD-10-CM

## 2024-12-09 DIAGNOSIS — R03.0 ELEVATED BLOOD PRESSURE READING WITHOUT DIAGNOSIS OF HYPERTENSION: ICD-10-CM

## 2024-12-09 DIAGNOSIS — R05.1 ACUTE COUGH: ICD-10-CM

## 2024-12-09 LAB
INFLUENZA A ANTIGEN, POC: POSITIVE
INFLUENZA B ANTIGEN, POC: NEGATIVE
Lab: NORMAL
QC PASS/FAIL: NORMAL
SARS-COV-2 RDRP RESP QL NAA+PROBE: NEGATIVE

## 2024-12-09 RX ORDER — OSELTAMIVIR PHOSPHATE 75 MG/1
75 CAPSULE ORAL 2 TIMES DAILY
Qty: 10 CAPSULE | Refills: 0 | Status: SHIPPED | OUTPATIENT
Start: 2024-12-09 | End: 2024-12-14

## 2024-12-09 RX ORDER — DEXTROMETHORPHAN HYDROBROMIDE AND PROMETHAZINE HYDROCHLORIDE 15; 6.25 MG/5ML; MG/5ML
5 SYRUP ORAL 4 TIMES DAILY PRN
Qty: 118 ML | Refills: 0 | Status: SHIPPED | OUTPATIENT
Start: 2024-12-09 | End: 2024-12-16

## 2024-12-09 NOTE — PROGRESS NOTES
Deshaun Andrade (:  1985) is a 39 y.o. male,New patient, here for evaluation of the following chief complaint(s):  Cold Symptoms (Loss taste, smell, congestion, body aches, fever, teary eyed, sinus pain, coughing and sneezing, headaches. )        Assessment       !. Influenza A   Tamiflu prescribed  2.Elevated BP without diagnosis of hypertension   Primary care referral  3.Viral illness  4.Acute cough   Promethazine with dextromethorphan prescribed  Plan    Patient has been diagnosed with Influenza A. Recommedation for treatment with Tamiflu as his symptoms have been less than 48 hours. Symptoms <48 hours: For outpatients with uncomplicated influenza, early antiviral treatment (within 48 hours of symptom onset) may be associated with a modest reduction in duration of illness (by approximately 24 hours)     Influenza characteristically begins with the abrupt onset of fever, nonproductive cough, and myalgia. Other common symptoms include including malaise, sore throat, nausea, nasal congestion, and headache.  In some cases, the onset of illness is so abrupt that patients can recall the precise time at which symptoms began. Fever usually ranges from 37.8 to 40.0°C (100 to 104°F) but can be as high as 41.1°C (106°F). Gastrointestinal symptoms such as vomiting and diarrhea are usually not part of influenza in adults but can occur in 10 to 20 percent of children.     I have discussed the results of my assessment, diagnosis and treatment plan with the patient. The patient also understands that early in the process of an illness, an Urgent Care work-up can be falsely reassuring. Therefore, if symptoms change, worsen or do not resolve and remain persistent, they should return to Urgent Care or seek further evaluation in the ED for immediate assessment. Additionally, they should continue care with their Primary provider. No further questions remained and patient was discharged to home.      Subjective      Cold Symptoms

## 2024-12-09 NOTE — PATIENT INSTRUCTIONS
Results for orders placed or performed in visit on 12/09/24   POCT COVID-19 Rapid, NAAT   Result Value Ref Range    SARS-COV-2, RdRp gene Negative Negative    Lot Number [id number]     QC Pass/Fail pass    POCT Influenza A/B Antigen   Result Value Ref Range    Inflenza A Ag positive     Influenza B Ag negative         You have been diagnosed with Influenza A. It is important to monitor your fever and take Tylenol and/or ibuprofen to keep your fever down and reduce body aches. For nasal congestion, Flonase nasal spray may be used for nasal stuffiness. To dry up nasal secretions you may use Sudafed, if you do not have high blood pressure. For people with hypertension, use Coricidin HBP. Mucinex (guaifenesin) will help thin secretions. Chloraseptic spray and Cepacol lozenges will help relieve the throat discomfort. Cough syrups with dextromethorphan will suppress cough.     It is also important to maintain good hydration, drink at least 64 ounces of fluid, water, juice, gingerale and gatorade are good choices. Avoid drinks with caffeine as they do not hydrate. Monitor for good urine output.     If fever persists, you develop abdominal pain, vomiting or shortness of breath, or do not  improve, please call PCP or go to nearest ED.     Your fever usually resolves in 48 to 72 hours. Other symptoms, such as cough and nasal stuffiness usually resolve in 7 to 10 days, but may last longer.    Thank you for coming in to the [facility] today. I hope you are feeling better soon!

## 2025-05-24 ENCOUNTER — HOSPITAL ENCOUNTER (EMERGENCY)
Facility: HOSPITAL | Age: 40
Discharge: HOME | End: 2025-05-25
Attending: EMERGENCY MEDICINE
Payer: COMMERCIAL

## 2025-05-24 ENCOUNTER — OFFICE VISIT (OUTPATIENT)
Age: 40
End: 2025-05-24

## 2025-05-24 VITALS
TEMPERATURE: 98.1 F | HEIGHT: 76 IN | DIASTOLIC BLOOD PRESSURE: 80 MMHG | RESPIRATION RATE: 18 BRPM | WEIGHT: 260.14 LBS | HEART RATE: 65 BPM | SYSTOLIC BLOOD PRESSURE: 130 MMHG | OXYGEN SATURATION: 99 % | BODY MASS INDEX: 31.68 KG/M2

## 2025-05-24 VITALS
HEART RATE: 77 BPM | OXYGEN SATURATION: 97 % | BODY MASS INDEX: 35.36 KG/M2 | TEMPERATURE: 98.1 F | DIASTOLIC BLOOD PRESSURE: 91 MMHG | RESPIRATION RATE: 20 BRPM | WEIGHT: 268 LBS | SYSTOLIC BLOOD PRESSURE: 129 MMHG

## 2025-05-24 DIAGNOSIS — K08.89 PAIN, DENTAL: Primary | ICD-10-CM

## 2025-05-24 DIAGNOSIS — R52 BODY ACHES: ICD-10-CM

## 2025-05-24 DIAGNOSIS — K13.79 ORAL PAIN: Primary | ICD-10-CM

## 2025-05-24 LAB
FLUAV RNA SPEC QL NAA+PROBE: NOT DETECTED
FLUBV RNA SPEC QL NAA+PROBE: NOT DETECTED
SARS-COV-2 RNA RESP QL NAA+PROBE: NOT DETECTED
SOURCE: NORMAL

## 2025-05-24 PROCEDURE — 6370000000 HC RX 637 (ALT 250 FOR IP)

## 2025-05-24 PROCEDURE — 87636 SARSCOV2 & INF A&B AMP PRB: CPT

## 2025-05-24 PROCEDURE — 99283 EMERGENCY DEPT VISIT LOW MDM: CPT

## 2025-05-24 RX ORDER — HYDROCODONE BITARTRATE AND ACETAMINOPHEN 5; 325 MG/1; MG/1
1 TABLET ORAL EVERY 8 HOURS PRN
Qty: 9 TABLET | Refills: 0 | Status: SHIPPED | OUTPATIENT
Start: 2025-05-24 | End: 2025-05-27

## 2025-05-24 RX ORDER — LIDOCAINE HYDROCHLORIDE 20 MG/ML
15 SOLUTION OROPHARYNGEAL ONCE
Status: COMPLETED | OUTPATIENT
Start: 2025-05-24 | End: 2025-05-24

## 2025-05-24 RX ORDER — CHLORHEXIDINE GLUCONATE ORAL RINSE 1.2 MG/ML
15 SOLUTION DENTAL 2 TIMES DAILY
Qty: 420 ML | Refills: 0 | Status: SHIPPED | OUTPATIENT
Start: 2025-05-24 | End: 2025-06-07

## 2025-05-24 RX ORDER — LAMOTRIGINE 25 MG/1
TABLET ORAL
COMMUNITY
Start: 2025-05-13

## 2025-05-24 RX ORDER — LIDOCAINE HYDROCHLORIDE 20 MG/ML
15 SOLUTION OROPHARYNGEAL
Qty: 100 ML | Refills: 0 | Status: SHIPPED | OUTPATIENT
Start: 2025-05-24

## 2025-05-24 RX ORDER — AMOXICILLIN 500 MG/1
500 CAPSULE ORAL 3 TIMES DAILY
Qty: 30 CAPSULE | Refills: 0 | Status: SHIPPED | OUTPATIENT
Start: 2025-05-24 | End: 2025-06-03

## 2025-05-24 RX ADMIN — DIPHENHYDRAMINE HCL ORAL: 12.5 SOLUTION ORAL at 22:12

## 2025-05-24 RX ADMIN — LIDOCAINE HYDROCHLORIDE 15 ML: 20 SOLUTION ORAL at 23:58

## 2025-05-24 ASSESSMENT — PAIN DESCRIPTION - FREQUENCY
FREQUENCY: CONTINUOUS
FREQUENCY: CONTINUOUS

## 2025-05-24 ASSESSMENT — PAIN DESCRIPTION - DESCRIPTORS
DESCRIPTORS: TENDER;SHOOTING;SHARP
DESCRIPTORS: SHOOTING

## 2025-05-24 ASSESSMENT — PAIN DESCRIPTION - ONSET
ONSET: PROGRESSIVE
ONSET: ON-GOING

## 2025-05-24 ASSESSMENT — PAIN SCALES - GENERAL
PAINLEVEL_OUTOF10: 6
PAINLEVEL_OUTOF10: 10

## 2025-05-24 ASSESSMENT — PAIN DESCRIPTION - LOCATION
LOCATION: MOUTH
LOCATION: MOUTH;TEETH

## 2025-05-24 ASSESSMENT — PAIN DESCRIPTION - PAIN TYPE
TYPE: ACUTE PAIN
TYPE: ACUTE PAIN

## 2025-05-24 ASSESSMENT — PAIN DESCRIPTION - ORIENTATION
ORIENTATION: LEFT;LOWER
ORIENTATION: LEFT;LOWER

## 2025-05-24 ASSESSMENT — PAIN - FUNCTIONAL ASSESSMENT
PAIN_FUNCTIONAL_ASSESSMENT: ACTIVITIES ARE NOT PREVENTED
PAIN_FUNCTIONAL_ASSESSMENT: PREVENTS OR INTERFERES WITH MANY ACTIVE NOT PASSIVE ACTIVITIES

## 2025-05-24 NOTE — PROGRESS NOTES
Deshaun Andrade (:  1985) is a 39 y.o. 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 male presents with symptoms of severe oral pain which is ongoing for the past 2 to 3 days.  He feels like his tongue is on fire and reports 10 out of 10 pain which is only mildly relieved with ibuprofen and Tylenol.  Denies any recent trauma or injury to his mouth.  Denies any history of similar symptoms.  He did recently restart Lamictal about 7 to 10 days ago.  He denies any rashes elsewhere.  Denies any fevers, chills, body aches or fatigue.         Vitals:    25   BP: (!) 129/91   BP Site: Right Upper Arm   Patient Position: Sitting   BP Cuff Size: Large Adult   Pulse: 77   Resp: 20   Temp: 98.1 °F (36.7 °C)   SpO2: 97%   Weight: 121.6 kg (268 lb)       No results found for this visit on 25.      Objective   Physical Exam  Vitals and nursing note reviewed.   Constitutional:       General: He is not in acute distress.     Appearance: Normal appearance. He is ill-appearing.   HENT:      Head: Normocephalic and atraumatic.      Mouth/Throat:      Mouth: Oral lesions present. No injury or lacerations.     Cardiovascular:      Rate and Rhythm: Normal rate and regular rhythm.      Pulses: Normal pulses.   Pulmonary:      Effort:

## 2025-05-24 NOTE — PATIENT INSTRUCTIONS
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

## 2025-05-25 NOTE — ED NOTES
Patient discharged by Cristino Harrison pt sent to the front lobby, with strong and steady gait, no acute distress noted at time of discharge - Discharge information / home RX / and reasons to return to the ED were reviewed by the ED provider.

## 2025-05-25 NOTE — DISCHARGE INSTRUCTIONS
Emergency Dental Care     [facility] - Operated by [facility]  [address] [place], [place] [address]  Open M, W, F: 8AM - 5PM and T, Th: 8AM-6PM  Phone: [phone], press 4  $70 for Emergency Care  $60 for first routine care, then pay by sliding scale based upon income.    [facility]'s [address] [address]  Phone: [phone]    [facility]  [address] [place] [address]  Open Monday - Friday 8AM - 4:30 PM  Phone: [phone]    [facility]  [facility], [facility], [address] [place] [address], 1st Floor  First Come First Service starting at 8:30 AM M-F  Phone: [phone], press 2  Fee: $150 per tooth (x-ray & extractions only)  Pediatrics Phone:: [phone], 8-5 M-F    [facility]  [facility], [facility], [address] [place] [address], 2nd Floor, [address]  First Come First Service starting at 8:30 AM - 3 PM M - F    [facility]  [address] [place] [address]  Phone: [phone] or [phone]  Emergency Hours: 9:30AM - 11AM (extractions)  Simple tooth extraction $ per tooth. #75 for x-ray    [facility] Residents only, over the age of 18  Phone: [phone]. Leave message saying you need an appointment to register.  Hours: Tuesday Evenings

## 2025-05-25 NOTE — ED TRIAGE NOTES
Pt arrived ambulatory to the ER with CC generalized body aches and left lower dental and generalized oral pain worsening over 3 days. Left lower tooth needs to be pulled. Left ear/jaw pain. +diarrhea. Difficulty eating/ drinking     Taking tylenol, motrin, and benzocaine gel around the clock without relief.     Denies N/V/C, +chills, loss of appetite, cough, headache, congestion

## 2025-05-25 NOTE — ED PROVIDER NOTES
[facility] EMERGENCY DEPARTMENT  EMERGENCY DEPARTMENT ENCOUNTER      Pt Name: Deshaun Andrade  MRN: [id number]  Birthdate 1985  Date of evaluation: 5/24/2025  Provider: Cash Gregg PA-C    CHIEF COMPLAINT       Chief Complaint   Patient presents with    Generalized Body Aches    Dental Pain         HISTORY OF PRESENT ILLNESS   (Location/Symptom, Timing/Onset, Context/Setting, Quality, Duration, Modifying Factors, Severity)  Note limiting factors.   HPI  39-year-old male presents to the ED with dental pain.  Reports experiencing left lower dental pain and generalized mouth pain for 3 days.  Reports a history of mouth and dental pain.  He reports that he has a tooth that needs to be pulled.  Has been unable to follow-up with dentist.  Reports painful eating and drinking because of the pain.  Is taking Tylenol, Motrin, benzocaine without significant relief.  Also reports chills without fever.  Additionally reports body aches.      Review of External Medical Records:     Nursing Notes were reviewed.    REVIEW OF SYSTEMS    (2-9 systems for level 4, 10 or more for level 5)     Review of Systems   Constitutional:         Body aches   HENT:  Positive for dental problem.        Except as noted above the remainder of the review of systems was reviewed and negative.       PAST MEDICAL HISTORY     Past Medical History:   Diagnosis Date    Abscess     Hepatitis C          SURGICAL HISTORY       Past Surgical History:   Procedure Laterality Date    WISDOM TOOTH EXTRACTION           CURRENT MEDICATIONS       Discharge Medication List as of 5/24/2025 11:51 PM        CONTINUE these medications which have NOT CHANGED    Details   lamoTRIgine (LAMICTAL) 25 MG tablet TAKE 1 TABLET BY MOUTH ONCE DAILY FOR 14 DAYS, THEN INCREASE TO 2 TABLETS DAILYHistorical Med             ALLERGIES     Patient has no known allergies.    FAMILY HISTORY     No family history on file.       SOCIAL HISTORY       Social History